# Patient Record
Sex: MALE | Race: WHITE
[De-identification: names, ages, dates, MRNs, and addresses within clinical notes are randomized per-mention and may not be internally consistent; named-entity substitution may affect disease eponyms.]

---

## 2019-10-12 ENCOUNTER — HOSPITAL ENCOUNTER (EMERGENCY)
Dept: HOSPITAL 25 - UCEAST | Age: 39
Discharge: HOME | End: 2019-10-12
Payer: COMMERCIAL

## 2019-10-12 VITALS — SYSTOLIC BLOOD PRESSURE: 100 MMHG | DIASTOLIC BLOOD PRESSURE: 64 MMHG

## 2019-10-12 DIAGNOSIS — R21: Primary | ICD-10-CM

## 2019-10-12 PROCEDURE — 99212 OFFICE O/P EST SF 10 MIN: CPT

## 2019-10-12 PROCEDURE — G0463 HOSPITAL OUTPT CLINIC VISIT: HCPCS

## 2019-10-12 NOTE — XMS REPORT
Continuity of Care Document (CCD)

 Created on:2019



Patient:Stanislaw Burgos

Sex:Male

:1980

External Reference #:[id number]





Demographics







 Address  [address] [id number]

 

 Mobile Phone  [phone]

 

 Preferred Language  en

 

 Marital Status  Not  or 

 

 Buddhist Affiliation  Unknown

 

 Race  White

 

 Ethnic Group  Not  or 









Author







 Name  Gee Bailon NP (transmitted by agent of provider Cynthia Osorio)

 

 Address  [address], [address]



   [place] [id number]









Care Team Providers







 Name  Role  Phone

 

 Angeli Sanchez MD - Internal Medicine  Care Team Information   [phone]
[phone]









Problems







 Active Problems  Provider  Date

 

 [facility]cystic [facility]  Onset: 2018







Social History







 Type  Date  Description  Comments

 

 Birth Sex    Unknown  

 

 Tobacco Use  Start: Unknown  Never Smoked Cigarettes  

 

 Smoking Status  Reviewed: 19  Never Smoked Cigarettes  

 

 ETOH Use  2018  consumed 1-2 glasses of wine per  



     week  

 

 Tobacco Use  Start: Unknown  Patient has never smoked  

 

 Recreational Drug Use    Denies Drug Use  

 

 Exercise Type/Frequency    Exercises sporadically  vigorous







Allergies, Adverse Reactions, Alerts







 Description

 

 No Known Drug Allergies







Medications







 Active Medications  SIG  Qnty  Indications  Ordering Provider  Date

 

 Vitamin B-12        Gee Bailon NP  2019



          50mcg Tablets          



           

 

 Bupropion Hydrochloride  1 by mouth  30tabs  F32.89  Gee Bailon NP  2019



 ER (XL)  every day        



     150mg Tablets ER          



 24HR          







Medications Administered in Office







 Medication  SIG  Qnty  Indications  Ordering Provider  Date

 

 Hepatitis B,Unspecified        Unknown  1998



          Injection          

 

 Td(Adult),Unspecified        Unknown  1996



        Injection          

 

 Hepatitis B,Unspecified        Unknown  1996



          Injection          

 

 Hepatitis B,Unspecified        Unknown  1995



          Injection          

 

 Polio,Unspecified        Unknown  1987



    Injection          







Immunizations







 CPT Code  Status  Date  Vaccine  Lot #

 

 [id number]  Given  2011  Measles Mumps And Rubella MMR  

 

 [id number]  Given  2010  Tetanus And Diptheria (Td) For Adult Use  



       Preservative Free  

 

 [id number]  Given  1996  Varicella (Chicken Pox) Immunization  

 

 92652  Given  1991  Measles Mumps And Rubella MMR  

 

 [id number]  Given  1983  Measles Mumps And Rubella MMR  







Vital Signs







 Date  Vital  Result  Comment

 

 2019 10:34am  Height  73.5 inches  6'1.50"









 Weight  182.38 lb  

 

 Heart Rate  66 /min  

 

 BP Systolic  100 mmHg  

 

 BP Diastolic  59 mmHg  

 

 Body Temperature  97.8 F  

 

 O2 % BldC Oximetry  97 %  

 

 BMI (Body Mass Index)  23.7 kg/m2  









 2019 11:57am  Height  73.5 inches  6'1.50"









 Weight  181.25 lb  

 

 Heart Rate  70 /min  

 

 BP Systolic  107 mmHg  

 

 BP Diastolic  70 mmHg  

 

 Body Temperature  97.3 F  

 

 O2 % BldC Oximetry  98 %  

 

 BMI (Body Mass Index)  23.6 kg/m2  







Results







 Test  Date  Facility  Test  Result  H/L  Range  Note

 

 CBC Auto  2019  [facility]  White Blood  5.8 10^3/uL  Normal  
3.5-10.8  



 Diff    [address]  Count        



     [place] [address] [phone]          









 Red Blood Count  5.18 10^6/uL  Normal  4.18-5.48  

 

 Hemoglobin  15.7 g/dL  Normal  14.0-18.0  

 

 Hematocrit  46 %  Normal  42-52  

 

 Mean Corpuscular Volume  90 fL  Normal  80-94  

 

 Mean Corpuscular Hemoglobin  30 pg  Normal  27-31  

 

 Mean Corpuscular HGB Conc  34 g/dL  Normal  31-36  

 

 Red Cell Distribution Width  13 %  Normal  10-15  

 

 Platelet Count  152 10^3/uL  Normal  150-450  

 

 Mean Platelet Volume  9.3 fL  Normal  7.4-10.4  

 

 Abs Neutrophils  3.2 10^3/uL  Normal  1.5-7.7  

 

 Abs Lymphocytes  1.8 10^3/uL  Normal  1.0-4.8  

 

 Abs Monocytes  0.5 10^3/uL  Normal  0-0.8  

 

 Abs Eosinophils  0.2 10^3/uL  Normal  0-0.6  

 

 Abs Basophils  0.0 10^3/uL  Normal  0-0.2  

 

 Abs Nucleated RBC  0.0 10^3/uL      

 

 Granulocyte %  55.4 %      

 

 Lymphocyte %  31.8 %      

 

 Monocyte %  8.8 %      

 

 Eosinophil %  3.7 %      

 

 Basophil %  0.3 %      

 

 Nucleated Red Blood Cells %  0.1      









 Laboratory  2019  [facility]  TSH (Thyroid  1.93  Normal  0.34
-5.60  



 test finding    [address]  Stim Horm)  mcIU/mL      



     [place] [address]          



     [phone]          









 Vitamin B12  209 pg/mL  Normal  180-914  1









 1  Normal Range 180 to 914



   Indeterminate Range 145 to 180



   Deficient Range  <145







Procedures







 Description

 

 No Information Available







Medical Devices







 Description

 

 No Information Available







Encounters







 Type  Date  Location  Provider  Dx  Diagnosis

 

 Office Visit  2019  [facility]  Gee Bailon NP  F32.89  Other specified



   11:40a  [facility]      depressive episodes







Assessments







 Date  Code  Description  Provider

 

 2019  F32.89  Other specified depressive episodes  Gee Bailon NP

 

 2019  F32.89  Other specified depressive episodes  Gee Bailon NP







Plan of Treatment

2019 - Gee Bailon NPF32.89 Other specified depressive episodesComments:I 
encourage you to start taking the bupropion soon.



Functional Status







 Description

 

 No Information Available







Mental Status







 Description

 

 No Information Available







Referrals







 Description

 

 No Information Available

## 2019-10-12 NOTE — UC
Skin Complaint HPI





- HPI Summary


HPI Summary: 





Pt presents with c/o circular red rash to left upper anterior chest wall. Pt 

states that he was bit by a tick 2 weeks ago and removed the tick at home.  Pt 

now states that 2 days ago he began to feel achy, feverish, and rash on upper 

anterior chest wall.  





- History of Current Complaint


Chief Complaint: UCSkin


Time Seen by Provider: 10/12/19 18:10


Stated Complaint: TICK BITE A FEW WEEKS AGO


Hx Obtained From: Patient


Onset/Duration: Sudden Onset, Lasting Days, Still Present


Skin Exposure Onset/Duration: Days Ago


Timing: Constant


Onset Severity: Mild


Current Severity: Mild


Pain Intensity: 0


Location: Discrete - left jd chest


Character: Redness


Aggravating Factor(s): Nothing


Alleviating Factor(s): Unknown


Associated Signs & Symptoms: Positive: Rash


Related History: Insect Bite/Sting





- Allergy/Home Medications


Allergies/Adverse Reactions: 


 Allergies











Allergy/AdvReac Type Severity Reaction Status Date / Time


 


No Known Allergies Allergy   Verified 10/12/19 18:17











Home Medications: 


 Home Medications





B12/Iodin/Mag/Zinc/Amanda/Bqdc384 [Adrenoid Capsule]  10/12/19 [History]


Mv-Min/Vit C/Glut/Lysine/Hb124 [Airborne Effervescent Tablet]  10/12/19 [History

]











PMH/Surg Hx/FS Hx/Imm Hx


Previously Healthy: Yes





- Surgical History


Surgical History: Yes


Surgery Procedure, Year, and Place: ACL reconstruction 2000





- Family History


Known Family History: Positive: Cardiac Disease





- Social History


Occupation: Employed Full-time


Lives: With Family


Alcohol Use: Weekly


Substance Use Type: None


Smoking Status (MU): Never Smoked Tobacco


Have You Smoked in the Last Year: No





Review of Systems


All Other Systems Reviewed And Are Negative: Yes


Constitutional: Positive: Fever, Chills


Skin: Positive: Rash - left anterior chest wall


Eyes: Positive: Negative


ENT: Positive: Negative


Respiratory: Positive: Negative


Cardiovascular: Positive: Negative


Gastrointestinal: Positive: Negative


Genitourinary: Positive: Negative


Motor: Positive: Negative


Neurovascular: Positive: Negative


Musculoskeletal: Positive: Myalgia


Neurological: Positive: Negative


Psychological: Positive: Negative


Is Patient Immunocompromised?: No





Physical Exam


Triage Information Reviewed: Yes


Appearance: Well-Appearing


Vital Signs: 


 Initial Vital Signs











Temp  100.1 F   10/12/19 18:10


 


Pulse  84   10/12/19 18:10


 


Resp  16   10/12/19 18:10


 


BP  100/64   10/12/19 18:10


 


Pulse Ox  99   10/12/19 18:10











Vital Signs Reviewed: Yes


Eye Exam: Normal


ENT: Positive: Hearing grossly normal


Dental Exam: Normal


Neck exam: Normal


Respiratory Exam: Normal


Cardiovascular Exam: Normal


Musculoskeletal Exam: Normal


Neurological Exam: Normal


Psychological Exam: Normal


Skin: Positive: Rashes - erythematous flat rash, circular, with some central 

clearing.





Course/Dx





- Differential Diagnoses - Skin Complaint


Differential Diagnoses: Cellulitis, Tick Born Illness





- Diagnoses


Provider Diagnosis: 


 Rash








Discharge ED





- Sign-Out/Discharge


Documenting (check all that apply): Patient Departure


All imaging exams completed and their final reports reviewed: No Studies





- Discharge Plan


Condition: Stable


Disposition: HOME


Prescriptions: 


DOXYcycline CAP(*) [DOXYcycline 100MG CAP(*)] 100 mg PO Q12H #20 cap


Patient Education Materials:  Tick Bite (ED)


Referrals: 


Gee Bailon NP [Primary Care Provider] - If Needed





- Billing Disposition and Condition


Condition: STABLE


Disposition: Home